# Patient Record
Sex: MALE | Employment: OTHER | URBAN - METROPOLITAN AREA
[De-identification: names, ages, dates, MRNs, and addresses within clinical notes are randomized per-mention and may not be internally consistent; named-entity substitution may affect disease eponyms.]

---

## 2018-06-18 PROBLEM — G47.33 OBSTRUCTIVE SLEEP APNEA (ADULT) (PEDIATRIC): Status: ACTIVE | Noted: 2018-06-18

## 2019-01-22 ENCOUNTER — OUTSIDE PLACE OF SERVICE (OUTPATIENT)
Dept: CARDIOLOGY | Facility: CLINIC | Age: 64
End: 2019-01-22
Payer: COMMERCIAL

## 2019-01-22 PROCEDURE — 93010 ELECTROCARDIOGRAM REPORT: CPT | Mod: S$GLB,,, | Performed by: INTERNAL MEDICINE

## 2019-01-22 PROCEDURE — 93010 PR ELECTROCARDIOGRAM REPORT: ICD-10-PCS | Mod: S$GLB,,, | Performed by: INTERNAL MEDICINE

## 2019-09-20 ENCOUNTER — HOSPITAL ENCOUNTER (EMERGENCY)
Facility: HOSPITAL | Age: 64
Discharge: HOME | End: 2019-09-20
Attending: EMERGENCY MEDICINE
Payer: COMMERCIAL

## 2019-09-20 VITALS
BODY MASS INDEX: 29.84 KG/M2 | TEMPERATURE: 98 F | WEIGHT: 240 LBS | DIASTOLIC BLOOD PRESSURE: 88 MMHG | OXYGEN SATURATION: 100 % | RESPIRATION RATE: 19 BRPM | HEART RATE: 66 BPM | HEIGHT: 75 IN | SYSTOLIC BLOOD PRESSURE: 182 MMHG

## 2019-09-20 DIAGNOSIS — M79.605 LEFT LEG PAIN: Primary | ICD-10-CM

## 2019-09-20 LAB
ALBUMIN SERPL BCP-MCNC: 4.1 G/DL (ref 3.5–5.2)
ALP SERPL-CCNC: 23 U/L (ref 38–126)
ALT SERPL W/O P-5'-P-CCNC: 53 U/L (ref 10–44)
ANION GAP SERPL CALC-SCNC: 5 MMOL/L (ref 8–16)
AST SERPL-CCNC: 48 U/L (ref 15–46)
BASOPHILS # BLD AUTO: 0.04 K/UL (ref 0–0.2)
BASOPHILS NFR BLD: 0.7 % (ref 0–1.9)
BILIRUB SERPL-MCNC: 0.8 MG/DL (ref 0.1–1)
BUN SERPL-MCNC: 14 MG/DL (ref 2–20)
CALCIUM SERPL-MCNC: 9.4 MG/DL (ref 8.7–10.5)
CHLORIDE SERPL-SCNC: 102 MMOL/L (ref 95–110)
CK SERPL-CCNC: 340 U/L (ref 55–170)
CO2 SERPL-SCNC: 29 MMOL/L (ref 23–29)
CREAT SERPL-MCNC: 0.94 MG/DL (ref 0.5–1.4)
D DIMER PPP FEU-MCNC: <200 NG/ML
DIFFERENTIAL METHOD: ABNORMAL
EOSINOPHIL # BLD AUTO: 0.1 K/UL (ref 0–0.5)
EOSINOPHIL NFR BLD: 1.1 % (ref 0–8)
ERYTHROCYTE [DISTWIDTH] IN BLOOD BY AUTOMATED COUNT: 17.5 % (ref 11.5–14.5)
EST. GFR  (AFRICAN AMERICAN): >60 ML/MIN/1.73 M^2
EST. GFR  (NON AFRICAN AMERICAN): >60 ML/MIN/1.73 M^2
GLUCOSE SERPL-MCNC: 81 MG/DL (ref 70–110)
HCT VFR BLD AUTO: 39.3 % (ref 40–54)
HGB BLD-MCNC: 12.5 G/DL (ref 14–18)
INR PPP: 1.1 (ref 0.8–1.2)
LYMPHOCYTES # BLD AUTO: 1.5 K/UL (ref 1–4.8)
LYMPHOCYTES NFR BLD: 27.3 % (ref 18–48)
MAGNESIUM SERPL-MCNC: 1.8 MG/DL (ref 1.6–2.6)
MCH RBC QN AUTO: 25.6 PG (ref 27–31)
MCHC RBC AUTO-ENTMCNC: 31.8 G/DL (ref 32–36)
MCV RBC AUTO: 81 FL (ref 82–98)
MONOCYTES # BLD AUTO: 0.8 K/UL (ref 0.3–1)
MONOCYTES NFR BLD: 13.8 % (ref 4–15)
NEUTROPHILS # BLD AUTO: 3.1 K/UL (ref 1.8–7.7)
NEUTROPHILS NFR BLD: 57.1 % (ref 38–73)
PLATELET # BLD AUTO: 173 K/UL (ref 150–350)
PMV BLD AUTO: 11.6 FL (ref 9.2–12.9)
POTASSIUM SERPL-SCNC: 3.8 MMOL/L (ref 3.5–5.1)
PROT SERPL-MCNC: 6.5 G/DL (ref 6–8.4)
PROTHROMBIN TIME: 11.5 SEC (ref 9–12.5)
RBC # BLD AUTO: 4.88 M/UL (ref 4.6–6.2)
SODIUM SERPL-SCNC: 136 MMOL/L (ref 136–145)
WBC # BLD AUTO: 5.43 K/UL (ref 3.9–12.7)

## 2019-09-20 PROCEDURE — 99283 EMERGENCY DEPT VISIT LOW MDM: CPT | Mod: ER

## 2019-09-20 PROCEDURE — 85025 COMPLETE CBC W/AUTO DIFF WBC: CPT | Mod: ER

## 2019-09-20 PROCEDURE — 80053 COMPREHEN METABOLIC PANEL: CPT | Mod: ER

## 2019-09-20 PROCEDURE — 85379 FIBRIN DEGRADATION QUANT: CPT | Mod: ER

## 2019-09-20 PROCEDURE — 82550 ASSAY OF CK (CPK): CPT | Mod: ER

## 2019-09-20 PROCEDURE — 83735 ASSAY OF MAGNESIUM: CPT | Mod: ER

## 2019-09-20 PROCEDURE — 85610 PROTHROMBIN TIME: CPT | Mod: ER

## 2019-09-20 NOTE — ED PROVIDER NOTES
Encounter Date: 9/20/2019       History     Chief Complaint   Patient presents with    Leg Pain     Pt left leg was hurting earlier today. Pt has Hx of DVTs.        09/20/2019  5:37 PM    Chief Complaint:  Left upper thigh pain      The patient is a 64 y.o. male presenting with left upper thigh pain. Patient has history of DVT and is worried he may have a blood clot in his left leg.  Patient started having left upper thigh pain today and he wanted to get it checked out.  Patient also reports increased weakness and fatigue recently but denies chest pain and shortness of breath. Patient denies tobacco use.  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    Patient has a past medical history of Anticoagulant long-term use, Arthritis, Back pain, Cancer, COPD (chronic obstructive pulmonary disease), Coronary artery disease, History of blood clots, Hypertension, Low-grade fibromyxoid sarcoma (2011), Panic attacks, Seizures (1980s), and Sleep apnea.  Patient has a past surgical history that includes Coronary artery bypass graft (02/27/2018); Appendectomy; cataracts; Tonsillectomy; Carpal tunnel release (Left); Trigger finger release (Bilateral); and Examination under anesthesia (N/A, 6/18/2018).        Review of patient's allergies indicates:   Allergen Reactions    Dilantin [phenytoin sodium extended] Other (See Comments)     High fever      Gralise [gabapentin] Anaphylaxis    Iodinated contrast media Rash     Past Medical History:   Diagnosis Date    Anticoagulant long-term use     Arthritis     Back pain     Cancer     COPD (chronic obstructive pulmonary disease)     Coronary artery disease     History of blood clots     left leg    Hypertension     Low-grade fibromyxoid sarcoma 2011    Panic attacks     Seizures 1980s    last seizure in 80s    Sleep apnea      Past Surgical History:   Procedure Laterality Date    APPENDECTOMY      CARPAL TUNNEL  RELEASE Left     cataracts      CORONARY ARTERY BYPASS GRAFT  2018    EXAM UNDER ANESTHESIA  / DISE N/A 2018    Performed by [facility] at [facility]    TONSILLECTOMY      TRIGGER FINGER RELEASE Bilateral      History reviewed. No pertinent family history.  Social History     Tobacco Use    Smoking status: Former Smoker     Packs/day: 0.25     Years: 1.00     Pack years: 0.25     Types: Cigarettes     Last attempt to quit: 2000     Years since quittin.7    Smokeless tobacco: Never Used   Substance Use Topics    Alcohol use: Yes     Comment: occasional    Drug use: Yes     Types: Oxycodone     Review of Systems   Neurological: Positive for weakness.   All other systems reviewed and are negative.      Physical Exam     Initial Vitals [19 1656]   BP Pulse Resp Temp SpO2   139/84 84 16 98.4 °F (36.9 °C) --      MAP       --         Physical Exam    Nursing note and vitals reviewed.  Constitutional: He appears well-developed and well-nourished.   HENT:   Head: Normocephalic and atraumatic.   Right Ear: External ear normal.   Left Ear: External ear normal.   Nose: Nose normal.   Mouth/Throat: Oropharynx is clear and moist.   Eyes: Conjunctivae and EOM are normal. Pupils are equal, round, and reactive to light.   Neck: Normal range of motion. Neck supple.   Cardiovascular: Normal rate, regular rhythm, normal heart sounds and intact distal pulses.   Pulmonary/Chest: Breath sounds normal.   Abdominal: Soft. Bowel sounds are normal.   Musculoskeletal: Normal range of motion. He exhibits tenderness.   Left anterior thigh tender to palpation. No tenderness on palpation of bilateral calves.  Dorsal pedal pulse 1+ bilaterally. Radial pulse 2 +bilaterally.   Neurological: He is alert and oriented to person, place, and time. He has normal strength. GCS score is 15. GCS eye subscore is 4. GCS verbal subscore is 5. GCS motor subscore is 6.   Skin: Skin is warm. Capillary refill takes less than  "2 seconds.         ED Course   Procedures  Labs Reviewed - No data to display      Results for orders placed or performed during the hospital encounter of 06/18/18   Protime-INR   Result Value Ref Range    PT 15.2 (H) 11.8 - 14.7 sec    INR 1.2    APTT   Result Value Ref Range    aPTT 35.4 24.6 - 36.7 sec   Basic metabolic panel   Result Value Ref Range    Sodium 142 136 - 145 mmol/L    Potassium 4.2 3.5 - 5.1 mmol/L    Chloride 105 95 - 110 mmol/L    CO2 27 22 - 31 mmol/L    Glucose 98 70 - 110 mg/dL    BUN, Bld 19 9 - 21 mg/dL    Creatinine 0.82 0.50 - 1.40 mg/dL    Calcium 9.1 8.4 - 10.2 mg/dL    Anion Gap 10 8 - 16 mmol/L    eGFR if African American >60 >60 mL/min/1.73 m^2    eGFR if non African American >60 >60 mL/min/1.73 m^2   CBC Without Differential   Result Value Ref Range    WBC 6.86 3.90 - 12.70 K/uL    RBC 5.00 4.60 - 6.20 M/uL    Hemoglobin 11.5 (L) 14.0 - 18.0 g/dL    Hematocrit 37.9 (L) 40.0 - 54.0 %    Mean Corpuscular Volume 76 (L) 82 - 98 fL    Mean Corpuscular Hemoglobin 23.0 (L) 27.0 - 31.0 pg    Mean Corpuscular Hemoglobin Conc 30.3 (L) 32.0 - 36.0 g/dL    RDW 19.4 (H) 11.5 - 14.5 %    Platelets 187 150 - 350 K/uL    MPV 11.1 9.2 - 12.9 fL         Imaging Results    None              - none    Vitals:    09/20/19 1656 09/20/19 1800 09/20/19 1900   BP: 139/84 (!) 143/75 (!) 182/88   Pulse: 84 73 66   Resp: 16 18 19   Temp: 98.4 °F (36.9 °C)     TempSrc: Oral     SpO2:  100% 100%   Weight: 108.9 kg (240 lb)     Height: 6' 3" (1.905 m)         Results for orders placed or performed during the hospital encounter of 09/20/19   CBC W/ AUTO DIFFERENTIAL   Result Value Ref Range    WBC 5.43 3.90 - 12.70 K/uL    RBC 4.88 4.60 - 6.20 M/uL    Hemoglobin 12.5 (L) 14.0 - 18.0 g/dL    Hematocrit 39.3 (L) 40.0 - 54.0 %    Mean Corpuscular Volume 81 (L) 82 - 98 fL    Mean Corpuscular Hemoglobin 25.6 (L) 27.0 - 31.0 pg    Mean Corpuscular Hemoglobin Conc 31.8 (L) 32.0 - 36.0 g/dL    RDW 17.5 (H) 11.5 - 14.5 % "    Platelets 173 150 - 350 K/uL    MPV 11.6 9.2 - 12.9 fL    Gran # (ANC) 3.1 1.8 - 7.7 K/uL    Lymph # 1.5 1.0 - 4.8 K/uL    Mono # 0.8 0.3 - 1.0 K/uL    Eos # 0.1 0.0 - 0.5 K/uL    Baso # 0.04 0.00 - 0.20 K/uL    Gran% 57.1 38.0 - 73.0 %    Lymph% 27.3 18.0 - 48.0 %    Mono% 13.8 4.0 - 15.0 %    Eosinophil% 1.1 0.0 - 8.0 %    Basophil% 0.7 0.0 - 1.9 %    Differential Method Automated    Comp. Metabolic Panel   Result Value Ref Range    Sodium 136 136 - 145 mmol/L    Potassium 3.8 3.5 - 5.1 mmol/L    Chloride 102 95 - 110 mmol/L    CO2 29 23 - 29 mmol/L    Glucose 81 70 - 110 mg/dL    BUN, Bld 14 2 - 20 mg/dL    Creatinine 0.94 0.50 - 1.40 mg/dL    Calcium 9.4 8.7 - 10.5 mg/dL    Total Protein 6.5 6.0 - 8.4 g/dL    Albumin 4.1 3.5 - 5.2 g/dL    Total Bilirubin 0.8 0.1 - 1.0 mg/dL    Alkaline Phosphatase 23 (L) 38 - 126 U/L    AST 48 (H) 15 - 46 U/L    ALT 53 (H) 10 - 44 U/L    Anion Gap 5 (L) 8 - 16 mmol/L    eGFR if African American >60.0 >60 mL/min/1.73 m^2    eGFR if non African American >60.0 >60 mL/min/1.73 m^2   Protime-INR   Result Value Ref Range    Prothrombin Time 11.5 9.0 - 12.5 sec    INR 1.1 0.8 - 1.2   Magnesium   Result Value Ref Range    Magnesium 1.8 1.6 - 2.6 mg/dL   CPK   Result Value Ref Range     (H) 55 - 170 U/L   D Dimer   Result Value Ref Range    D-Dimer <200 <230 ng/mL      Imaging Results    None            The above test results and vital signs have been reviewed by the physician.  I have not determined a specific etiology for patient's symptoms based on evaluation in the ED, but I have low suspicion for medical, surgical or other life threatening illness and I believe patient is safe for discharge.  With lack of etiology for the patient's complaints, I specifically counseled the patient and/or family/caretaker that despite an unrevealing evaluation in the ED, patient may still be at risk for serious, even life threatening illness.  As such, patient was instructed to return  immediately for any worsening or change in current symptoms, or for any concern.                        Clinical Impression:       ICD-10-CM ICD-9-CM   1. Left leg pain M79.605 729.5       Disposition:   Disposition: Discharged  Condition: Stable                        Otto Jennings MD  09/20/19 1757

## 2021-10-26 ENCOUNTER — OUTSIDE PLACE OF SERVICE (OUTPATIENT)
Dept: CARDIOLOGY | Facility: CLINIC | Age: 66
End: 2021-10-26
Payer: MEDICARE

## 2021-10-26 PROCEDURE — 93010 ELECTROCARDIOGRAM REPORT: CPT | Mod: ,,, | Performed by: INTERNAL MEDICINE

## 2021-10-26 PROCEDURE — 93010 PR ELECTROCARDIOGRAM REPORT: ICD-10-PCS | Mod: ,,, | Performed by: INTERNAL MEDICINE
